# Patient Record
Sex: FEMALE | Race: WHITE
[De-identification: names, ages, dates, MRNs, and addresses within clinical notes are randomized per-mention and may not be internally consistent; named-entity substitution may affect disease eponyms.]

---

## 2019-08-02 ENCOUNTER — HOSPITAL ENCOUNTER (OUTPATIENT)
Dept: HOSPITAL 75 - CARD | Age: 54
End: 2019-08-02
Attending: PHYSICIAN ASSISTANT
Payer: COMMERCIAL

## 2019-08-02 VITALS — DIASTOLIC BLOOD PRESSURE: 84 MMHG | SYSTOLIC BLOOD PRESSURE: 148 MMHG

## 2019-08-02 VITALS — SYSTOLIC BLOOD PRESSURE: 146 MMHG | DIASTOLIC BLOOD PRESSURE: 82 MMHG

## 2019-08-02 VITALS — WEIGHT: 210 LBS | HEIGHT: 64 IN | BODY MASS INDEX: 35.85 KG/M2

## 2019-08-02 DIAGNOSIS — R06.09: ICD-10-CM

## 2019-08-02 DIAGNOSIS — E66.9: ICD-10-CM

## 2019-08-02 DIAGNOSIS — Z72.0: ICD-10-CM

## 2019-08-02 DIAGNOSIS — I10: Primary | ICD-10-CM

## 2019-08-02 PROCEDURE — 78452 HT MUSCLE IMAGE SPECT MULT: CPT

## 2019-08-02 PROCEDURE — 93017 CV STRESS TEST TRACING ONLY: CPT

## 2019-08-05 NOTE — STRESS TEST
DATE OF SERVICE:  08/02/2019



LEXISCAN MYOVIEW STRESS TEST REPORT



REFERRING PHYSICIAN:

Johnna Cook MD and Sohail Dubose MD.



Baseline heart rate is 51.  Baseline blood pressure 146/82.  Baseline EKG is

sinus rhythm with no ischemic changes.



In summary, the patient was injected with 10.81 mCi of technetium-99 Myoview and

the resting images were obtained.  Then, the patient received 0.4 mg of Lexiscan

followed by 32.5 mCi of technetium-99 Myoview.  Throughout the test, there were

no EKG changes.



The resting and stress images were reviewed and compared in the short axis,

horizontal long axis, and vertical long axis views.  Review of the images showed

good radiotracer uptake with no significant ischemia or infarction.  SSS is 1,

SDS 1, TID value 1.08.  On the gated images, the left ventricle appeared to be

normal size with normal contractility.  Calculated ejection fraction 67%.



CONCLUSION:

1.  The patient tolerated Lexiscan well.

2.  No ischemia or infarction on SPECT images.

3.  Normal left ventricular size with normal contractility.  Calculated ejection

fraction 67%.





Job ID: [id number]

DocumentID: [id number]

Dictated Date:  08/05/2019 16:43:30

Transcription Date: 08/05/2019 23:09:22

Dictated By: JOSE ARTEAGA MD

## 2019-08-29 ENCOUNTER — HOSPITAL ENCOUNTER (OUTPATIENT)
Dept: HOSPITAL 75 - PREOP | Age: 54
Discharge: HOME | End: 2019-08-29
Attending: ORTHOPAEDIC SURGERY
Payer: COMMERCIAL

## 2019-08-29 VITALS — DIASTOLIC BLOOD PRESSURE: 79 MMHG | SYSTOLIC BLOOD PRESSURE: 137 MMHG

## 2019-08-29 VITALS — WEIGHT: 208 LBS | HEIGHT: 64 IN | BODY MASS INDEX: 35.51 KG/M2

## 2019-08-29 DIAGNOSIS — M17.11: ICD-10-CM

## 2019-08-29 DIAGNOSIS — Z01.818: Primary | ICD-10-CM

## 2019-08-29 LAB
ALBUMIN SERPL-MCNC: 4 GM/DL (ref 3.2–4.5)
ALP SERPL-CCNC: 118 U/L (ref 40–136)
ALT SERPL-CCNC: 37 U/L (ref 0–55)
APTT PPP: YELLOW S
BACTERIA #/AREA URNS HPF: (no result) /HPF
BASOPHILS # BLD AUTO: 0 10^3/UL (ref 0–0.1)
BASOPHILS NFR BLD AUTO: 0 % (ref 0–10)
BILIRUB SERPL-MCNC: 0.4 MG/DL (ref 0.1–1)
BILIRUB UR QL STRIP: NEGATIVE
BUN/CREAT SERPL: 11
CALCIUM SERPL-MCNC: 8.9 MG/DL (ref 8.5–10.1)
CHLORIDE SERPL-SCNC: 104 MMOL/L (ref 98–107)
CO2 SERPL-SCNC: 20 MMOL/L (ref 21–32)
CREAT SERPL-MCNC: 0.82 MG/DL (ref 0.6–1.3)
EOSINOPHIL # BLD AUTO: 0.2 10^3/UL (ref 0–0.3)
EOSINOPHIL NFR BLD AUTO: 2 % (ref 0–10)
ERYTHROCYTE [DISTWIDTH] IN BLOOD BY AUTOMATED COUNT: 12.6 % (ref 10–14.5)
ERYTHROCYTE [SEDIMENTATION RATE] IN BLOOD: 21 MM/HR (ref 0–30)
FIBRINOGEN PPP-MCNC: CLEAR MG/DL
GFR SERPLBLD BASED ON 1.73 SQ M-ARVRAT: > 60 ML/MIN
GLUCOSE SERPL-MCNC: 119 MG/DL (ref 70–105)
GLUCOSE UR STRIP-MCNC: NEGATIVE MG/DL
HCT VFR BLD CALC: 43 % (ref 35–52)
HGB BLD-MCNC: 14.2 G/DL (ref 11.5–16)
INR PPP: 1 (ref 0.8–1.4)
KETONES UR QL STRIP: NEGATIVE
LEUKOCYTE ESTERASE UR QL STRIP: NEGATIVE
LYMPHOCYTES # BLD AUTO: 3.3 X 10^3 (ref 1–4)
LYMPHOCYTES NFR BLD AUTO: 34 % (ref 12–44)
MANUAL DIFFERENTIAL PERFORMED BLD QL: NO
MCH RBC QN AUTO: 29 PG (ref 25–34)
MCHC RBC AUTO-ENTMCNC: 33 G/DL (ref 32–36)
MCV RBC AUTO: 88 FL (ref 80–99)
MONOCYTES # BLD AUTO: 0.5 X 10^3 (ref 0–1)
MONOCYTES NFR BLD AUTO: 6 % (ref 0–12)
NEUTROPHILS # BLD AUTO: 5.5 X 10^3 (ref 1.8–7.8)
NEUTROPHILS NFR BLD AUTO: 58 % (ref 42–75)
NITRITE UR QL STRIP: NEGATIVE
PH UR STRIP: 6 [PH] (ref 5–9)
PLATELET # BLD: 269 10^3/UL (ref 130–400)
PMV BLD AUTO: 10 FL (ref 7.4–10.4)
POTASSIUM SERPL-SCNC: 3.2 MMOL/L (ref 3.6–5)
PROT SERPL-MCNC: 7.4 GM/DL (ref 6.4–8.2)
PROT UR QL STRIP: NEGATIVE
PROTHROMBIN TIME: 13.6 SEC (ref 12.2–14.7)
RBC #/AREA URNS HPF: (no result) /HPF
SODIUM SERPL-SCNC: 139 MMOL/L (ref 135–145)
SP GR UR STRIP: 1.01 (ref 1.02–1.02)
SQUAMOUS #/AREA URNS HPF: (no result) /HPF
UROBILINOGEN UR-MCNC: NORMAL MG/DL
WBC # BLD AUTO: 9.5 10^3/UL (ref 4.3–11)
WBC #/AREA URNS HPF: (no result) /HPF

## 2019-08-29 PROCEDURE — 81000 URINALYSIS NONAUTO W/SCOPE: CPT

## 2019-08-29 PROCEDURE — 86850 RBC ANTIBODY SCREEN: CPT

## 2019-08-29 PROCEDURE — 86900 BLOOD TYPING SEROLOGIC ABO: CPT

## 2019-08-29 PROCEDURE — 86901 BLOOD TYPING SEROLOGIC RH(D): CPT

## 2019-08-29 PROCEDURE — 85025 COMPLETE CBC W/AUTO DIFF WBC: CPT

## 2019-08-29 PROCEDURE — 36415 COLL VENOUS BLD VENIPUNCTURE: CPT

## 2019-08-29 PROCEDURE — 85610 PROTHROMBIN TIME: CPT

## 2019-08-29 PROCEDURE — 87081 CULTURE SCREEN ONLY: CPT

## 2019-08-29 PROCEDURE — 85652 RBC SED RATE AUTOMATED: CPT

## 2019-08-29 PROCEDURE — 71046 X-RAY EXAM CHEST 2 VIEWS: CPT

## 2019-08-29 PROCEDURE — 80053 COMPREHEN METABOLIC PANEL: CPT

## 2019-08-29 NOTE — HISTORY AND PHYSICAL
DATE OF SERVICE:  



ADMISSION HISTORY AND PHYSICAL



DATE OF SERVICE, SURGERY AND ADMISSION:

09/04/2019.



This will be an inpatient admission on 09/04/2019, for right total knee

arthroplasty.  The patient will require regular inpatient admission due to gait

abnormalities, weakness and pain management.



HISTORY OF PRESENT ILLNESS:

The patient is a 54-year-old female with progressively worsening right knee

pain.  She reports the pain is interfering with her activities of daily living. 

She has tried extensive conservative measures including activity modifications,

altered shoe wear, home exercise program and anti-inflammatories without

significant relief.  Due to progressive pain and failure to improve with

conservative measures, the patient elected to proceed with surgical

intervention.  Radiographs revealed severe medial and patellofemoral joint space

narrowing.



REVIEW OF SYSTEMS:

No chest pain, no shortness of breath, no dysuria.



PAST MEDICAL HISTORY:

Reflux, hypertension, tobacco use.



PAST SURGICAL HISTORY:

Hysterectomy, tubal ligation, D and C.



FAMILY HISTORY:

Significant for colon cancer, diabetes.



PRIMARY CARE PROVIDER:

Dr. Cook.



MEDICATIONS:

Ziac, Nexium and Mobic.



ALLERGIES:

No known drug allergies.



SOCIAL HISTORY:

The patient smokes a pack and half cigarettes per day.  Denies alcohol use.



PHYSICAL EXAMINATION:

GENERAL:  The patient is a well-developed, well-nourished, in no acute distress.

HEENT:  Normocephalic, atraumatic.  Pupils are equal, round and reactive to

light.  Oropharynx is clear.

NECK:  Supple, no lymphadenopathy.

LUNGS:  Clear to auscultation bilaterally.

HEART:  Regular rate and rhythm.

ABDOMEN:  Soft, nontender, nondistended.

EXTREMITIES:  The patient ambulates with an antalgic gait on the right.  The

right knee demonstrates a moderate effusion.  She is markedly tender along the

medial joint line, has pain medially with Ulices's.  She has marked

patellofemoral crepitus and pain with patellar loading.  Range of motion

0/0/120.  There is no significant varus valgus laxity.  Negative anterior and

posterior drawer.



IMPRESSION:

Right knee severe osteoarthritis, unresponsive to conservative measures.



PLAN:

Right total knee arthroplasty.  The risks, benefits, options, ramifications and

recovery have been discussed at length with the patient.  She understands and

wishes to proceed.





Job ID: [id number]

DocumentID: [id number]

Dictated Date:  08/25/2019 15:12:40

Transcription Date: 08/25/2019 16:07:34

Dictated By: LEESA BAILON MD

## 2019-08-29 NOTE — DIAGNOSTIC IMAGING REPORT
INDICATION: Preoperative evaluation for knee replacement.



COMPARISON: 01/11/2019



FINDINGS: Frontal and lateral views of the chest demonstrate

normal heart size and pulmonary vascularity. The lungs are clear.

There are no signs of infiltrate, pleural effusions or

pneumothoraces. The visualized osseous structures show no acute

abnormalities.



IMPRESSION: 

1. No acute process. No signs of infiltrates, effusions or

pneumothoraces.



Dictated by: 



  Dictated on workstation # [id number]

## 2019-08-29 NOTE — NUR
VERIFIED WITH [facility] WHAT THEY HAVE FILLED RECENTLY FOR THE PATIENT. 
THE TWO PRESCRIPTIONS ENTERED IN PREOP MATCH THE PHARMACIES RECORDS. ALSO REPORTED IS NEXIUM 
Q48H OTC. I DID NOT CALL AND RE-INTERVIEW THE PATIENT AT THIS TIME.



[facility] FILLED:

8-8-19 MOBIC 15MG DAILY #30

8-6-19 BISOPROLOL HCTZ 2.5-6.25MG DAILY #30

## 2019-09-04 ENCOUNTER — HOSPITAL ENCOUNTER (INPATIENT)
Dept: HOSPITAL 75 - 4TH | Age: 54
LOS: 2 days | Discharge: HOME HEALTH | DRG: 470 | End: 2019-09-06
Attending: ORTHOPAEDIC SURGERY | Admitting: ORTHOPAEDIC SURGERY
Payer: COMMERCIAL

## 2019-09-04 VITALS — DIASTOLIC BLOOD PRESSURE: 78 MMHG | SYSTOLIC BLOOD PRESSURE: 145 MMHG

## 2019-09-04 VITALS — DIASTOLIC BLOOD PRESSURE: 69 MMHG | SYSTOLIC BLOOD PRESSURE: 145 MMHG

## 2019-09-04 VITALS — SYSTOLIC BLOOD PRESSURE: 131 MMHG | DIASTOLIC BLOOD PRESSURE: 76 MMHG

## 2019-09-04 VITALS — SYSTOLIC BLOOD PRESSURE: 120 MMHG | DIASTOLIC BLOOD PRESSURE: 73 MMHG

## 2019-09-04 VITALS — DIASTOLIC BLOOD PRESSURE: 77 MMHG | SYSTOLIC BLOOD PRESSURE: 127 MMHG

## 2019-09-04 VITALS — HEIGHT: 64 IN | BODY MASS INDEX: 35.55 KG/M2 | WEIGHT: 208.25 LBS

## 2019-09-04 VITALS — SYSTOLIC BLOOD PRESSURE: 148 MMHG | DIASTOLIC BLOOD PRESSURE: 72 MMHG

## 2019-09-04 VITALS — DIASTOLIC BLOOD PRESSURE: 77 MMHG | SYSTOLIC BLOOD PRESSURE: 124 MMHG

## 2019-09-04 VITALS — DIASTOLIC BLOOD PRESSURE: 81 MMHG | SYSTOLIC BLOOD PRESSURE: 128 MMHG

## 2019-09-04 VITALS — DIASTOLIC BLOOD PRESSURE: 65 MMHG | SYSTOLIC BLOOD PRESSURE: 122 MMHG

## 2019-09-04 VITALS — SYSTOLIC BLOOD PRESSURE: 114 MMHG | DIASTOLIC BLOOD PRESSURE: 66 MMHG

## 2019-09-04 VITALS — SYSTOLIC BLOOD PRESSURE: 111 MMHG | DIASTOLIC BLOOD PRESSURE: 64 MMHG

## 2019-09-04 VITALS — SYSTOLIC BLOOD PRESSURE: 144 MMHG | DIASTOLIC BLOOD PRESSURE: 85 MMHG

## 2019-09-04 DIAGNOSIS — F17.210: ICD-10-CM

## 2019-09-04 DIAGNOSIS — K21.9: ICD-10-CM

## 2019-09-04 DIAGNOSIS — I10: ICD-10-CM

## 2019-09-04 DIAGNOSIS — M17.11: Primary | ICD-10-CM

## 2019-09-04 PROCEDURE — 85014 HEMATOCRIT: CPT

## 2019-09-04 PROCEDURE — 36415 COLL VENOUS BLD VENIPUNCTURE: CPT

## 2019-09-04 PROCEDURE — 86901 BLOOD TYPING SEROLOGIC RH(D): CPT

## 2019-09-04 PROCEDURE — 86900 BLOOD TYPING SEROLOGIC ABO: CPT

## 2019-09-04 PROCEDURE — 73560 X-RAY EXAM OF KNEE 1 OR 2: CPT

## 2019-09-04 PROCEDURE — 94664 DEMO&/EVAL PT USE INHALER: CPT

## 2019-09-04 PROCEDURE — 0SRC0J9 REPLACEMENT OF RIGHT KNEE JOINT WITH SYNTHETIC SUBSTITUTE, CEMENTED, OPEN APPROACH: ICD-10-PCS | Performed by: ORTHOPAEDIC SURGERY

## 2019-09-04 PROCEDURE — 85018 HEMOGLOBIN: CPT

## 2019-09-04 PROCEDURE — 86850 RBC ANTIBODY SCREEN: CPT

## 2019-09-04 RX ADMIN — OXYCODONE HYDROCHLORIDE AND ACETAMINOPHEN PRN TAB: 5; 325 TABLET ORAL at 15:06

## 2019-09-04 RX ADMIN — OXYCODONE HYDROCHLORIDE AND ACETAMINOPHEN PRN TAB: 5; 325 TABLET ORAL at 19:02

## 2019-09-04 RX ADMIN — SODIUM CHLORIDE, SODIUM LACTATE, POTASSIUM CHLORIDE, AND CALCIUM CHLORIDE PRN MLS/HR: 600; 310; 30; 20 INJECTION, SOLUTION INTRAVENOUS at 07:50

## 2019-09-04 RX ADMIN — DIPHENHYDRAMINE HYDROCHLORIDE PRN MG: 50 INJECTION INTRAMUSCULAR; INTRAVENOUS at 19:01

## 2019-09-04 RX ADMIN — DOCUSATE SODIUM AND SENNOSIDES SCH EA: 8.6; 5 TABLET, FILM COATED ORAL at 20:26

## 2019-09-04 RX ADMIN — SODIUM CHLORIDE SCH MLS/HR: 900 INJECTION, SOLUTION INTRAVENOUS at 11:09

## 2019-09-04 RX ADMIN — DOCUSATE SODIUM AND SENNOSIDES SCH EA: 8.6; 5 TABLET, FILM COATED ORAL at 12:28

## 2019-09-04 RX ADMIN — SODIUM CHLORIDE SCH MLS/HR: 900 INJECTION, SOLUTION INTRAVENOUS at 23:19

## 2019-09-04 RX ADMIN — OXYCODONE HYDROCHLORIDE AND ACETAMINOPHEN PRN TAB: 5; 325 TABLET ORAL at 11:00

## 2019-09-04 RX ADMIN — SODIUM CHLORIDE SCH MLS/HR: 900 INJECTION, SOLUTION INTRAVENOUS at 21:58

## 2019-09-04 RX ADMIN — CEFUROXIME SCH MLS/HR: 750 INJECTION, POWDER, FOR SOLUTION INTRAMUSCULAR; INTRAVENOUS at 15:17

## 2019-09-04 RX ADMIN — DIPHENHYDRAMINE HYDROCHLORIDE PRN MG: 50 INJECTION INTRAMUSCULAR; INTRAVENOUS at 13:27

## 2019-09-04 RX ADMIN — SODIUM CHLORIDE, SODIUM LACTATE, POTASSIUM CHLORIDE, AND CALCIUM CHLORIDE PRN MLS/HR: 600; 310; 30; 20 INJECTION, SOLUTION INTRAVENOUS at 06:35

## 2019-09-04 RX ADMIN — OXYCODONE HYDROCHLORIDE AND ACETAMINOPHEN PRN TAB: 5; 325 TABLET ORAL at 23:19

## 2019-09-04 RX ADMIN — CEFUROXIME SCH MLS/HR: 750 INJECTION, POWDER, FOR SOLUTION INTRAMUSCULAR; INTRAVENOUS at 23:19

## 2019-09-04 NOTE — OPERATIVE REPORT
DATE OF SERVICE:  09/04/2019



PREOPERATIVE DIAGNOSIS:

Right knee primary osteoarthritis.



POSTOPERATIVE DIAGNOSIS:

Right knee primary osteoarthritis.



PROCEDURE:

Right total knee arthroplasty.



SURGEON:

Sohail Bailon MD



ASSISTANT:

Joshua Armenta, who assisted throughout the procedure and closed the incision.



ANESTHESIA:

General endotracheal by Dr. Anthony.



TOURNIQUET TIME:

Approximately, 70 minutes at 300 mmHg.



ESTIMATED BLOOD LOSS:

Minimal.



DRAINS:

None.



COMPLICATIONS:

None.



POSTOPERATIVE PLAN:

Routine protocol.  The patient was transferred to the recovery room awake and in

stable condition.



MATERIALS:

Microport cemented size 4 femur, cemented size 4 tibia with a 10 mm insert and

cemented size 32 patellar button.



STATEMENT OF MEDICAL NECESSITY:

The patient is a 54-year-old female with longstanding progressive left knee

pain.  Radiographs revealed severe medial with moderate patellofemoral

arthrosis.  She has tried extensive conservative measures, but had continued

unrelenting pain with activity limitations and because of this, the patient

elected to proceed with surgical intervention.



DESCRIPTION OF PROCEDURE:

After risks and benefits of procedure were discussed and questions were

answered, an informed consent was signed and placed on chart.  The operative

site was confirmed in the preoperative holding area initialed by the surgeon. 

The patient was then transferred to the operating room.  After adequate levels

of general endotracheal anesthetic were obtained, a timeout was called,

confirming the operative site.  The right lower extremity was prepped and draped

in the usual sterile fashion with the leg elevated and the knee flexed. 

Tourniquet was inflated to 300 mmHg.  A standard anterior approach was utilized.

 Hemostasis was obtained with cautery.  A medial parapatellar arthrotomy was

performed leaving 1 cm cuff on the patella for later reapproximation.  A portion

of the fat pad was resected.  The ACL was resected.  A subperiosteal release was

carefully performed on the proximal medial tibia being careful to stay on the

bony surface.  Intramedullary guide was passed into the femur.  The distal

cutting block was placed and the femur was sized to size 4.  The 4 cutting block

was placed parallel to the epicondylar axis and cuts were made from posterior to

anterior.  Subperiosteal release was then carefully performed on the posterior

distal femur, being careful to stay on the bony surface.  A portion of the fat

pad was resected.  The intramedullary guide was passed into the tibia.  The

cutting block was placed and drop derrell transected the intermalleolar axis and the

cut was made.  The four baseplate was placed and the drop derrell transected the

intermalleolar axis.  This was then prepared with the drill and keel punch.  The

femoral trial was placed and the trochlear cut was made.  The patella was then

prepared by using the freehand technique and resecting 10 mm off the

undersurface.  The peg guide was placed and the peg holes were drilled.  The

trials were inserted.  Full extension was easily obtained, 120 degrees of

flexion with gravity was easily obtained.  The patella tracked well.  There was

no anterior/posterior or medial/lateral laxity in flexion or extension.  The

trials were removed.  The joint was copiously irrigated with pulse lavage.  The

periarticular block was placed in the posterior capsule, medial and lateral

retinaculum, extensor mechanism, subcutaneous tissues.  The joint was further

irrigated.  The bone ends were irrigated and dried.  The tibial baseplate was

cemented into position.  Excessive cement was removed.  The superior surface was

irrigated and dried and the polyethylene insert was placed.  Distal femur was

irrigated and dried and the femoral prosthesis was cemented into position.  The

knee was brought out into full extension until the cement had cured.  The

undersurface of the patella was irrigated and dried.  The patellar button was

cemented into position.  Excessive cement was removed.  Once the cement had

cured, the knee was taken through range of motion.  Full extension was easily

obtained, 120 degrees of flexion with gravity was easily obtained.  The patella

tracked well.  There was no significant anterior/posterior or medial/lateral

laxity in flexion or extension.  The joint was further irrigated with pulse

lavage.  The arthrotomy was closed with #2 Tevdek in figure-of-eight interrupted

fashion.  Subcutaneous tissues were irrigated using a total of 6 liters

throughout the procedure.  A 0 Vicryl was used for deep subcutaneous tissue, 2-0

Vicryl for the superficial subcutaneous tissue, staples were used on the skin. 

A soft dressing was applied.  The tourniquet was deflated and the patient was

transferred to recovery room awake and in stable condition.





Job ID: [id number]

DocumentID: [id number]

Dictated Date:  09/04/2019 09:23:08

Transcription Date: 09/04/2019 12:36:14

Dictated By: SOHAIL BAILON MD

## 2019-09-04 NOTE — PHYSICAL THERAPY EVALUATION
PT Evaluation-General


Medical Diagnosis


Admission Date


Sep 4, 2019 at 06:00


Medical Diagnosis:  right TKA


Onset Date:  Sep 4, 2019





Therapy Diagnosis


Therapy Diagnosis:  impaired mobility, strength, endurance, ROM





Height/Weight


Height (Feet):  5


Height (Inches):  4.00


Weight (Pounds):  208


Weight (Ounces):  4.0





Precautions


Precautions/Isolations:  Standard Precautions





Weight Bear Status


Right Lower Extremity:  Right


Weight Bearing/Tolerated





Referral


Physician:  Joshua Armenta


Reason for Referral:  Evaluation/Treatment





Medical History


Pertinent Medical History:  HTN


Additional Medical History


reflux, tobacco use, hysterectomy, tubal ligation


Reviewed History:  Yes





Social History


Home:  Multilevel


Current Living Status:  Spouse


Entry Into Home:  Stairs With Railing


PT Steps Into Home:  3





Prior/Core FIM


Prior Level of Function


Therapy Code Descriptions/Definitions 





Functional [place] Measure:


0=Not Assessed/NA        4=Minimal Assistance


1=Total Assistance        5=Supervision or Setup


2=Maximal Assistance  6=Modified [place]


3=Moderate Assistance 7=Complete [place]








Therapy Quality Codes:


6    Independent with activity with or without an assistive device


5    Patient requires set up or clean up by helper.  Patient completes activity 

by  themselves


4    Supervision or touching assist (CGA). [place] provide cues , steadying 

assist


3    The helper provides less than half the effort to complete the activity


2    The helper provides more than half the effort to complete the activity


1    Dependent.  The helper does all the effort to complete an activity 


7    Patient refused to complete or attempt activity


9    The patient did not perform the activity before the current illness or 

injury


88  Not attempted due to Medical conditions or safety concerns





Functional Abilities and Goals:


Independent: Patient completed the activities by him/herself, with or without an

assistive device, with no assistance from a helper.


Needed Some Help: Patient needed partial assistance from another person to 

complete activities.


Dependent: A helper completed the activities for the patient. 


Unknown:


Not Applicable:


Bed Mobility:  7


Transfers (B,C,W/C) (FIM):  7


Gait:  7


Stairs:  7


Indoor Mobility (Ambulation):  Independent


Stairs:  Independent





PT Evaluation-Current


Subjective


Patient in bed pre tx, agrees to PT, has 8/10 pain in right knee.  Patient is 

very drowsy and states she has had some Benadryl for an allergic reaction and 

she is very nauseated.  She is slurring words and can barely converse.  Patient 

will not be ambulating this morning due to safety reasons and nausea.





Pt/Family Goals


to be independent at home





Objective


Patient Orientation:  Person, [place]


Attachments:  Oxygen, IV





ROM/Strength


ROM Lower Extremities


right knee extension +6 degrees, flexion 70 degrees





Sensory


Vision:  Wears Glasses


Hearing:  Functional


Sensation Right Lower Extremit:  Impaired


Sensation Left Lower Extremity:  Intact





Transfers


Therapy Code Descriptions/Definitions 





Functional [place] Measure:


0=Not Assessed/NA        4=Minimal Assistance


1=Total Assistance        5=Supervision or Setup


2=Maximal Assistance  6=Modified [place]


3=Moderate Assistance 7=Complete [place]





Treatment


Supine TKA exercises on the right side x10 (AP, QS, HS, SAQ, SLR).  CPM applied 

and set to her leg and set to -2/50.





Assessment/Needs


Patient has impaired mobility, strength, endurance, ROM.  She is very lethargic 

and nauseated.


Rehab Potential:  Fair





PT Short Term Goals


Short Term Goals


Time Frame:  Sep 11, 2019


Transfers (B,C,W/C) (FIM):  4


Gait (FIM):  2


Gait Distance Comment:  50'


Gait Level of Assist:  4


Gait Assistive Device:  FWW





PT Plan


Problem List


Problem List:  Activity Tolerance, Functional Strength, Safety, Balance, Gait, 

Transfer, Bed Mobility, ROM





Treatment/Plan


Treatment Plan:  Continue Plan of Care


Treatment Plan:  Bed Mobility, Education, Functional Activity Luke, Functional 

Strength, Gait, Safety, Therapeutic Exercise, Transfers


Treatment Duration:  Sep 11, 2019


Frequency:  11 times per week


Estimated Hrs Per Day:  .25 hour per day


Patient and/or Family Agrees t:  Yes





Safety Risks/Education


Patient Education:  Correct Positioning, Safety Issues


Teaching Recipient:  Patient


Teaching Methods:  Demonstration, Discussion


Response to Teaching:  Reinforcement Needed





Discharge Recommendations


Plan


Patient will perform bed mobility and transfer training, balance and endurance 

training, functional strengthening, stair training, gait training, and 

education, to improve functional mobility and independence at home.


Therapy Discharge Recommendati:  Other, See Comments (home with family)





Time/GCodes


Time In:  1334


Time Out:  1350


Total Billed Treatment Time:  16


Total Billed Treatment


1 visit


ZAKI Torres'











AGUILAR ARCHULETA PT                 Sep 4, 2019 14:04

## 2019-09-04 NOTE — PROGRESS NOTE
Standard Progress Note


Progress Notes/Assess & Plan


Date Seen by a Provider:  Sep 4, 2019


Time Seen by a Provider:  10:09


Progress/Assessment & Plan


post op check


 No complaints


denies paresthesias


Radiographs--HW well positioned without fracture


RLE--intact DF and PF of toes and ankle. sym DP pulse with brisk cap refill. 

sensation intact to light touch throughout


s/p R TKA


mobilize as able











LEESA BAILON MD             Sep 4, 2019 10:11

## 2019-09-04 NOTE — NUR
DEWEY TROY S admitted to room OR-1, with an admitting diagnosis of right total knee 
replacement , on 09/04/19 from OR , accompanied by staff  present in room 
.DEWEY TROY introduced to surroundings, call light, bed controls, phone, TV, 
temperature control, lights, meal times, smoking policy, visitor policy, side rail policy, 
bathrooms and showers.  Patient Rights given to patient in the handbook. DEWEY TROY 
verbalizes understanding that [facility] is not responsible for the loss or damage to any 
personal effects or valuables that are kept in the patients posession during their 
hospitalization.  The following Patient Care Plans and discharge were discussed with the 
patient and  present. DEWEY TROY verbalizes understanding of Interdisciplinary 
Patient Education. Patient and family were informed about the Rapid Response Team and its 
purpose.

## 2019-09-04 NOTE — D/C HH FACE TO FACE ORDER
D/C  Face to Face Orders


Reconcile Patient Problems


Problems Reviewed?:  Yes





Instructions for Patient


[facility] [employer], [phone]


Patient Instructions/FollowUp:  


three weeks


Physician to follow Patient:  three weeks


Discharge Diet for Home:  ADA Diet





Patient Data-Allergies,Ht & Wt


Patient Allergies:  


Coded Allergies:  


     No Known Drug Allergies (Unverified , 8/29/19)


Height (Feet):  5


Height (Inches):  4.00


Weight (Pounds):  208


Weight (Ounces):  4.0





Home Health Need/Face to Face


Date of Face to Face:  Sep 4, 2019


Clinical Findings:  Instability, Muscle weakness, Pain with ambulation, Unsteady

gait


I have seen Pt face-to-face:  Yes


Discharged To:  Home


Diagnosis/Conditions:  


right total knee arthroplasty


Patient is Homebound due to:  Pattie fall risk due to instabilty, Muscle 

weakness, Pain w/ambulation


Homebound Status


   Due to the above stated illness, injury or surgical procedure (medical 

condition or diagnosis) and associated clinical findings, the patient is 

homebound because of his/her inability to leave home except with aid of a 

supportive device and/or person AND leaving the home requires a considerable and

taxing effort or is medically contraindicated.


Pt req the following assistanc:  Walker





Home Health Nursing Orders


Home Health Services Order:  Physical Therapy-Evaluate & Treat





DC right knee staples and apply steri strips 9/18/19





Home Health Infusion Therapy


Line Start Date:  Sep 4, 2019





Therapy Orders


Therapy Orders:  Physical Therapy, PT to assess for OT


Therapy Specific Orders:  Eval assistive deivces, Teach enviro 

modifications/safety, Gait training, Increase strength/endurance, Provider 

maintenance therapy, Restore ROM





OK to begin on Monday


Certify Stmt


I certify that this patient is under my care and that I, a nurse practitioner or

a physician; a assistant working with me, had a face to face encounter that -

meets the physician face to face encounter requirements with this patient as 

dated.











LEESA BAILON MD             Sep 4, 2019 07:28
today

## 2019-09-04 NOTE — DIAGNOSTIC IMAGING REPORT
INDICATION: 

Right total knee arthroplasty.



COMPARISON:  

None available.



TECHNIQUE: 

Two radiographs of the right knee dated 09/04/2019. 



FINDINGS: 

Recent placement of a right total knee arthroplasty is noted with

post surgical subcutaneous emphysema and skin staples in place.

No evidence of immediate hardware complication. No acute fracture

or dislocation. No destructive osseous process. No suspicious

radiopaque foreign body.



IMPRESSION: 

Recent placement of a right total knee arthroplasty without

evidence of immediate hardware complication or acute osseous

abnormality.



Dictated by: 



  Dictated on workstation # [id number]

## 2019-09-04 NOTE — PROGRESS NOTE-POST OPERATIVE
Post-Operative Progess Note


Surgeon (s)/Assistant (s)


Surgeon


LEESA BAILON MD


Assistant:  Joshua Armenta





Pre-Operative Diagnosis


right knee primary osteoarthritis





Post-Operative Diagnosis





right knee primary osteoarthritis





Procedure & Operative Findings


Date of Procedure


9/4/19


Procedure Performed/Findings


right total knee arthroplasty


Anesthesia Type


GETA





Estimated Blood Loss


Estimated blood loss (mL):  minimal





Specimens/Packing


Specimens Removed


none


Packing:  


none











LEESA BAILON MD             Sep 4, 2019 07:31

## 2019-09-04 NOTE — PROGRESS NOTE-PRE OPERATIVE
Pre-Operative Progress Note


H&P Reviewed


The H&P was reviewed, patient examined and no changes noted.


Date Seen by Provider:  Sep 4, 2019


Time Seen by Provider:  07:15


Date H&P Reviewed:  Sep 4, 2019


Time H&P Reviewed:  07:11


Pre-Operative Diagnosis:  right knee primary osteoarthritis











LEESA BAILON MD             Sep 4, 2019 07:30

## 2019-09-05 VITALS — SYSTOLIC BLOOD PRESSURE: 108 MMHG | DIASTOLIC BLOOD PRESSURE: 65 MMHG

## 2019-09-05 VITALS — DIASTOLIC BLOOD PRESSURE: 80 MMHG | SYSTOLIC BLOOD PRESSURE: 153 MMHG

## 2019-09-05 VITALS — SYSTOLIC BLOOD PRESSURE: 121 MMHG | DIASTOLIC BLOOD PRESSURE: 80 MMHG

## 2019-09-05 VITALS — DIASTOLIC BLOOD PRESSURE: 75 MMHG | SYSTOLIC BLOOD PRESSURE: 118 MMHG

## 2019-09-05 VITALS — SYSTOLIC BLOOD PRESSURE: 130 MMHG | DIASTOLIC BLOOD PRESSURE: 74 MMHG

## 2019-09-05 LAB
HCT VFR BLD CALC: 39 % (ref 35–52)
HGB BLD-MCNC: 12.8 G/DL (ref 11.5–16)

## 2019-09-05 RX ADMIN — MORPHINE SULFATE PRN MG: 4 INJECTION, SOLUTION INTRAMUSCULAR; INTRAVENOUS at 08:13

## 2019-09-05 RX ADMIN — OXYCODONE HYDROCHLORIDE AND ACETAMINOPHEN PRN TAB: 5; 325 TABLET ORAL at 19:36

## 2019-09-05 RX ADMIN — SODIUM CHLORIDE SCH MLS/HR: 900 INJECTION, SOLUTION INTRAVENOUS at 10:52

## 2019-09-05 RX ADMIN — OXYCODONE HYDROCHLORIDE AND ACETAMINOPHEN PRN TAB: 5; 325 TABLET ORAL at 12:27

## 2019-09-05 RX ADMIN — OXYCODONE HYDROCHLORIDE AND ACETAMINOPHEN PRN TAB: 5; 325 TABLET ORAL at 14:40

## 2019-09-05 RX ADMIN — ASPIRIN SCH MG: 81 TABLET ORAL at 08:02

## 2019-09-05 RX ADMIN — DOCUSATE SODIUM AND SENNOSIDES SCH EA: 8.6; 5 TABLET, FILM COATED ORAL at 08:02

## 2019-09-05 RX ADMIN — MORPHINE SULFATE PRN MG: 4 INJECTION, SOLUTION INTRAMUSCULAR; INTRAVENOUS at 22:44

## 2019-09-05 RX ADMIN — OXYCODONE HYDROCHLORIDE AND ACETAMINOPHEN PRN TAB: 5; 325 TABLET ORAL at 03:34

## 2019-09-05 RX ADMIN — OXYCODONE HYDROCHLORIDE AND ACETAMINOPHEN PRN TAB: 5; 325 TABLET ORAL at 06:33

## 2019-09-05 RX ADMIN — ENOXAPARIN SODIUM SCH MG: 30 INJECTION SUBCUTANEOUS at 19:37

## 2019-09-05 RX ADMIN — CYCLOBENZAPRINE HYDROCHLORIDE PRN MG: 10 TABLET, FILM COATED ORAL at 14:02

## 2019-09-05 RX ADMIN — OXYCODONE HYDROCHLORIDE AND ACETAMINOPHEN PRN TAB: 5; 325 TABLET ORAL at 10:20

## 2019-09-05 RX ADMIN — OXYCODONE HYDROCHLORIDE AND ACETAMINOPHEN PRN TAB: 5; 325 TABLET ORAL at 21:40

## 2019-09-05 RX ADMIN — MORPHINE SULFATE PRN MG: 4 INJECTION, SOLUTION INTRAMUSCULAR; INTRAVENOUS at 18:03

## 2019-09-05 RX ADMIN — OXYCODONE HYDROCHLORIDE AND ACETAMINOPHEN PRN TAB: 5; 325 TABLET ORAL at 16:52

## 2019-09-05 RX ADMIN — OXYCODONE HYDROCHLORIDE AND ACETAMINOPHEN PRN TAB: 5; 325 TABLET ORAL at 23:44

## 2019-09-05 RX ADMIN — DIPHENHYDRAMINE HYDROCHLORIDE PRN MG: 50 INJECTION INTRAMUSCULAR; INTRAVENOUS at 00:10

## 2019-09-05 RX ADMIN — DOCUSATE SODIUM AND SENNOSIDES SCH EA: 8.6; 5 TABLET, FILM COATED ORAL at 19:36

## 2019-09-05 RX ADMIN — ENOXAPARIN SODIUM SCH MG: 30 INJECTION SUBCUTANEOUS at 08:02

## 2019-09-05 RX ADMIN — Medication SCH EA: at 06:32

## 2019-09-05 RX ADMIN — MORPHINE SULFATE PRN MG: 4 INJECTION, SOLUTION INTRAMUSCULAR; INTRAVENOUS at 13:36

## 2019-09-05 NOTE — NUR
Initial visit; the pt shared that she and her  Kaushal used to attend [employer] 
[employer] until some recent changes within the [employer]. She described feeling displaced at 
this time as they continue to attend various churches in hopes of establishing a home 
[employer]. The pt also shared concerns for her , feeling her hospitalization to concern 
him. The pt welcomed prayer for her healing process, for her , and for spiritual 
guidance.

## 2019-09-05 NOTE — NUR
CM/SS, respond to consult.



HHC:  Coordinated with patient preferred agency that is in network with insurance, [place] 
[employer]ef[facility], [facility].  They are seeking pre auth through benefits.



DME:  Patient has FWW, stool riser, shower chair.  She indicates no other DME needs at this 
time.



Patient will return home with spouse as before.  Follow for any change in circumstances that 
require alternate discharge plan.

## 2019-09-05 NOTE — PROGRESS NOTE
Standard Progress Note


Progress Notes/Assess & Plan


Date Seen by a Provider:  Sep 5, 2019


Time Seen by a Provider:  07:40


Progress/Assessment & Plan


post op check


 No complaints


denies paresthesias


Radiographs--HW well positioned without fracture


RLE--intact DF and PF of toes and ankle. sym DP pulse with brisk cap refill. 

sensation intact to light touch throughout


s/p R TKA


mobilize as able


Final Diagnosis


no complaints


ambulating with assistance





Vital Signs








  Date Time  Temp Pulse Resp B/P (MAP) Pulse Ox O2 Delivery O2 Flow Rate FiO2


 


9/5/19 04:00 98.3 57 18 108/65 (79) 95 Room Air  


 


9/5/19 00:00 96.6 54 18 130/74 (92) 98 Room Air  


 


9/4/19 21:00      Room Air  


 


9/4/19 20:00 97.8 76 18 131/76 (94) 95 Room Air  


 


9/4/19 17:38      Room Air  


 


9/4/19 16:00 97.2 82 18 128/81 (97) 96 Room Air  


 


9/4/19 12:00 97.9 76 18 124/77 (93) 96 Room Air  


 


9/4/19 10:15      Room Air  


 


9/4/19 10:15 97.8  20  94 Room Air  


 


9/4/19 10:10   18  95 Room Air  


 


9/4/19 10:00   18  95 Room Air  


 


9/4/19 10:00      Room Air  


 


9/4/19 09:55      OxyMask 6 


 


9/4/19 09:50   18  99 OxyMask 6 


 


9/4/19 09:43      OxyMask 6 


 


9/4/19 09:42      OxyMask 6 


 


9/4/19 09:40   18  99 OxyMask 6 


 


9/4/19 09:30      OxyMask 6 


 


9/4/19 09:30   18  99 OxyMask 6 


 


9/4/19 09:25   18  98 OxyMask 6 


 


9/4/19 09:18 97.5  12  99 OxyMask 6 


 


9/4/19 09:18      OxyMask 6 














I & O 


 


 9/5/19





 07:00


 


Intake Total 2515 ml


 


Balance 2515 ml








Laboratory Tests








Test


 9/5/19


06:20 Range/Units


 


 


Hemoglobin 12.8  11.5-16.0  G/DL


 


Hematocrit 39  35-52  %





RLE--dressing intact. No calf tenderness. Neg Evens's. NVI distally


s/p RTKA doing well


PT/OT











LEESA BAILON MD             Sep 5, 2019 07:41

## 2019-09-05 NOTE — ANESTHESIA-GENERAL POST-OP
General


Patient Condition


Mental Status/LOC:  Same as Preop


Cardiovascular:  Satisfactory


Nausea/Vomiting:  Absent


Respiratory:  Satisfactory


Pain:  Controlled


Complications:  Absent





Post Op Complications


Complications


None





Follow Up Care/Instructions


Patient Instructions


None needed.





Anesthesia/Patient Condition


Patient Condition


Patient is doing well, no complaints, stable vital signs, no apparent adverse 

anesthesia problems.   


No complications reported per nursing.











FRANCISCO J MURILLO CRNA           Sep 5, 2019 08:44

## 2019-09-05 NOTE — NUR
IRF Evaluation 



Order received to evaluate patient for the ARU. Chart review complete and it appears patient 
is up ad markus in room; modified independent with transfers and ambulation (400ft, FW); 
therefore, patient does not require intensive therapies, at this time. 



Thank you for this referral.

## 2019-09-05 NOTE — PHYSICAL THERAPY DAILY NOTE
PT Daily Note-Current


Subjective


Patient agrees to PT.  Rated right knee pain 4/10.





Pain





   Numeric Pain Scale:  4


   Location:  Right


   Location Body Site:  Knee


   Pain Description:  Acute





Mental Status


Patient Orientation:  Normal For Age


Attachments:  IV





Transfers


Therapy Code Descriptions/Definitions 





Functional [place] Measure:


0=Not Assessed/NA        4=Minimal Assistance


1=Total Assistance        5=Supervision or Setup


2=Maximal Assistance  6=Modified [place]


3=Moderate Assistance 7=Complete [place]








Therapy Quality Codes:


6    Independent with activity with or without an assistive device


5    Patient requires set up or clean up by helper.  Patient completes activity 

by  themselves


4    Supervision or touching assist (CGA). [place] provide cues , steadying 

assist


3    The helper provides less than half the effort to complete the activity


2    The helper provides more than half the effort to complete the activity


1    Dependent.  The helper does all the effort to complete an activity 


7    Patient refused to complete or attempt activity


9    The patient did not perform the activity before the current illness or 

injury


88  Not attempted due to Medical conditions or safety concerns


Transfers (B, C, W/C) (FIM):  6


Scootin


Supine to/from Sit:  6


Sit to/from Stand:  6





Weight Bearing


Right Lower Extremity:  Right


Weight Bearing/Tolerated





Gait Training


Gait (FIM):  6


Distance (FIM):  3=150 ft


Distance:  400'


Gait Level of Assist:  6


Gait Assistive Device:  FWW


steady, antalgic gait sequence





Exercises


Supine Ex:  Ankle pumps, Quad Set, Heel Slides, Straight leg raise


Supine Reps:  15


Seated Therapy Exercises:  Ankle pumps, Long arc quads


Seated Reps:  15





Treatments


CPM 0-70 degrees with polar pack in place after treatment.





Assessment


Patient tolerated treatment well and returned to bed with CPM in place.  Plan 

dismissal to home tomorrow after PT.





PT Short Term Goals


Short Term Goals


Time Frame:  Sep 11, 2019


Transfers (B,C,W/C) (FIM):  4


Gait (FIM):  2


Gait Distance Comment:  50'


Gait Level of Assist:  4


Gait Assistive Device:  FWW





PT Plan


Treatment/Plan


Treatment Plan:  Continue Plan of Care


Treatment Plan:  Bed Mobility, Education, Functional Activity [place], Functional 

Strength, Gait, Safety, Therapeutic Exercise, Transfers


Treatment Duration:  Sep 11, 2019


Frequency:  11 times per week


Estimated Hrs Per Day:  .25 hour per day


Patient and/or Family Agrees t:  Yes





Time/GCodes


Time In:  900


Time Out:  923


Total Billed Treatment Time:  23


Total Billed Treatment


1 visit


GT 9 min


EX 14 min











INGA GRANADO PT               Sep 5, 2019 09:49

## 2019-09-05 NOTE — DISCHARGE SUMMARY
DATE OF SERVICE:  



DIAGNOSES:

1.  Right knee primary osteoarthritis.

2.  Reflux.

3.  Hypertension.



PROCEDURE:

Right total knee arthroplasty.



SUMMARY:

The patient is a 54-year-old female, who was admitted the day of a right total

knee arthroplasty, which she underwent without complications.  Postoperatively,

she did very well.  At the time of discharge, her wound was clean and dry.  She

had no calf tenderness.  Negative Homans sign.  She was tolerating diet well and

tolerating pain with oral pain medication.



CONDITION AT DISCHARGE:

Good.



DISCHARGE DIET:

Regular.



FOLLOWUP:

Followup is in 3 weeks.  Home physical therapy has been arranged.



DISCHARGE MEDICATIONS:

Home medications, Percocet as needed for pain and aspirin.





Job ID: [id number]

DocumentID: [id number]

Dictated Date:  09/05/2019 07:39:34

Transcription Date: 09/05/2019 08:45:14

Dictated By: LEESA BAILON MD

## 2019-09-05 NOTE — OCC THERAPY PROGRESS NOTE
Therapy Progress Note


SCREEN ONLY. pt is ad markus in room MOD I using RW with good safety awareness. pt 

stated she is indep both NSG and PT confirmed. OT services not indicate 

secondary to pt being indep. pt agreed she does not need OT services. d/c OT at 

this time. Thank you for the referral.











CLAUDY ZHU OT                Sep 5, 2019 13:16

## 2019-09-05 NOTE — PHYSICAL THERAPY DAILY NOTE
PT Daily Note-Current


Subjective


Patient agrees to PT.





Pain





   Numeric Pain Scale:  8


   Location:  Right


   Location Body Site:  Knee


   Pain Description:  Acute





Mental Status


Patient Orientation:  Normal For Age


Attachments:  IV





Transfers


Therapy Code Descriptions/Definitions 





Functional [place] Measure:


0=Not Assessed/NA        4=Minimal Assistance


1=Total Assistance        5=Supervision or Setup


2=Maximal Assistance  6=Modified [place]


3=Moderate Assistance 7=Complete [place]








Therapy Quality Codes:


6    Independent with activity with or without an assistive device


5    Patient requires set up or clean up by helper.  Patient completes activity 

by  themselves


4    Supervision or touching assist (CGA). [place] provide cues , steadying 

assist


3    The helper provides less than half the effort to complete the activity


2    The helper provides more than half the effort to complete the activity


1    Dependent.  The helper does all the effort to complete an activity 


7    Patient refused to complete or attempt activity


9    The patient did not perform the activity before the current illness or 

injury


88  Not attempted due to Medical conditions or safety concerns


Transfers (B, C, W/C) (FIM):  6


Scootin


Rollin


Supine to/from Sit:  6


Sit to/from Stand:  6





Weight Bearing


Right Lower Extremity:  Right


Weight Bearing/Tolerated





Gait Training


Gait (FIM):  6


Distance (FIM):  3=150 ft


Distance:  400'


Gait Level of Assist:  6


Gait Assistive Device:  FWW


steady, antalgic gait sequence





Exercises


Supine Ex:  Ankle pumps, Quad Set, Heel Slides, Straight leg raise


Supine Reps:  15


Seated Therapy Exercises:  Long arc quads


Seated Reps:  15





Assessment


Patient tolerated treatment well and is in bed with CPM 0-70 in place.





PT Short Term Goals


Short Term Goals


Time Frame:  Sep 11, 2019


Transfers (B,C,W/C) (FIM):  4


Gait (FIM):  2


Gait Distance Comment:  50'


Gait Level of Assist:  4


Gait Assistive Device:  FWW





PT Plan


Treatment/Plan


Treatment Plan:  Continue Plan of Care


Treatment Plan:  Bed Mobility, Education, Functional Activity Luke, Functional 

Strength, Gait, Safety, Therapeutic Exercise, Transfers


Treatment Duration:  Sep 11, 2019


Frequency:  11 times per week


Estimated Hrs Per Day:  .25 hour per day


Patient and/or Family Agrees t:  Yes





Time/GCodes


Time In:  1300


Time Out:  1323


Total Billed Treatment Time:  23


Total Billed Treatment


1 visit


EX 11 in


GT 12 min











INGA GRANADO PT               Sep 5, 2019 14:04

## 2019-09-06 VITALS — SYSTOLIC BLOOD PRESSURE: 175 MMHG | DIASTOLIC BLOOD PRESSURE: 84 MMHG

## 2019-09-06 VITALS — SYSTOLIC BLOOD PRESSURE: 158 MMHG | DIASTOLIC BLOOD PRESSURE: 78 MMHG

## 2019-09-06 VITALS — DIASTOLIC BLOOD PRESSURE: 84 MMHG | SYSTOLIC BLOOD PRESSURE: 175 MMHG

## 2019-09-06 LAB
HCT VFR BLD CALC: 36 % (ref 35–52)
HGB BLD-MCNC: 11.3 G/DL (ref 11.5–16)

## 2019-09-06 RX ADMIN — HYDROMORPHONE HYDROCHLORIDE PRN MG: 2 TABLET ORAL at 12:01

## 2019-09-06 RX ADMIN — OXYCODONE HYDROCHLORIDE AND ACETAMINOPHEN PRN TAB: 5; 325 TABLET ORAL at 03:50

## 2019-09-06 RX ADMIN — HYDROMORPHONE HYDROCHLORIDE PRN MG: 2 TABLET ORAL at 16:19

## 2019-09-06 RX ADMIN — HYDROMORPHONE HYDROCHLORIDE PRN MG: 2 TABLET ORAL at 10:05

## 2019-09-06 RX ADMIN — CYCLOBENZAPRINE HYDROCHLORIDE PRN MG: 10 TABLET, FILM COATED ORAL at 01:43

## 2019-09-06 RX ADMIN — MORPHINE SULFATE PRN MG: 4 INJECTION, SOLUTION INTRAMUSCULAR; INTRAVENOUS at 08:53

## 2019-09-06 RX ADMIN — MORPHINE SULFATE PRN MG: 4 INJECTION, SOLUTION INTRAMUSCULAR; INTRAVENOUS at 04:52

## 2019-09-06 RX ADMIN — HYDROMORPHONE HYDROCHLORIDE PRN MG: 2 TABLET ORAL at 14:07

## 2019-09-06 RX ADMIN — SODIUM CHLORIDE SCH MLS/HR: 900 INJECTION, SOLUTION INTRAVENOUS at 10:05

## 2019-09-06 RX ADMIN — DOCUSATE SODIUM AND SENNOSIDES SCH EA: 8.6; 5 TABLET, FILM COATED ORAL at 08:51

## 2019-09-06 RX ADMIN — ENOXAPARIN SODIUM SCH MG: 30 INJECTION SUBCUTANEOUS at 08:51

## 2019-09-06 RX ADMIN — DIPHENHYDRAMINE HYDROCHLORIDE PRN MG: 50 INJECTION INTRAMUSCULAR; INTRAVENOUS at 12:05

## 2019-09-06 RX ADMIN — Medication SCH EA: at 05:19

## 2019-09-06 RX ADMIN — ASPIRIN SCH MG: 81 TABLET ORAL at 08:50

## 2019-09-06 RX ADMIN — OXYCODONE HYDROCHLORIDE AND ACETAMINOPHEN PRN TAB: 5; 325 TABLET ORAL at 01:43

## 2019-09-06 NOTE — NUR
CRYING, WANTS DIFFERENT PAIN MEDICATIONS, ASHAW HERE AND ORDERS GIVEN, IV FLUIDS DC, HOME 
MEDICATIONS STARTED, DILAUDID 2MG  STARTED AS ORDERED.

## 2019-09-06 NOTE — NUR
PATIENT REPORTED INCREASED PAIN WHEN THIS NURSE ASSESSED PATIENT AT BEGINNING OF SHIFT. THIS 
NURSE WOKE PATIENT UP EVERY TWO HOURS TO ADMINISTER PAIN MEDICATION TO TRY TO KEEP PAIN AT A 
TOLERABLE LEVEL. AT 0350 THIS NURSE ADMINISTERED PERCOCET AND WHEN THIS NURSE WENT TO 
PATIENT ROOM TO REASSESS PATIENT WAS LAYING IN BED WITH EYES CLOSED, APPEARED TO BE 
SLEEPING. APPROXIMATELY THIRTY MINUTES LATER CNA WENT TO PATIENT ROOM TO CHECK VITAL SIGNS 
AND WHEN CNA WOKE PATIENT UP PATIENT BECAME TEARFUL AND SAID THAT HER PAIN WAS A TEN. THIS 
NURSE TOOK PATIENT A DOSE OF IV MORPHINE AND TOLD PATIENT THAT IF HER PAIN INCREASED FROM A 
5 TO A 10 AN HOUR POST-MED (PERCOCET) THAT THE PERCOCET WAS NOT EFFECTIVE IN CONTROLLING HER 
PAIN AND WE WOULD NEED TO FIGURE OUT SOMETHING ELSE FOR PAIN CONTROL. THIS NURSE SPOKE WITH 
DUDLEY FOX AND UPDATED ON PATIENT CONDITION. SADIE FOX GAVE ORDER TO INCREASE PATIENT 
PERCOCET DOSE TO PERCOCET 5/325MG TWO TABS EVERY FOUR HOURS PRN PAIN. THIS NURSE UPDATED 
PATIENT ON NEW ORDER AND ADMINISTERED ONE PERCOCET 5/325MG (AS PATIENT HAD ONE AT 0350). 
PATIENT HAS HAD CPM ON INTERMITTENTLY THROUGHOUT THE SHIFT, MOHIT HOSE ON, AND POLAR PACK IN 
PLACE. PATIENT IS C/O MUSCLE CRAMPING AT THIS TIME. PRN FLEXERIL ADMINISTERED AT 0145. NO 
SWELLING NOTED TO RLE, PULSES ARE PALPABLE, COLOR IS NORMAL FOR RACE, RLE IS WARM. WILL 
CONTINUE TO MONITOR.

## 2019-09-06 NOTE — PROGRESS NOTE
Standard Progress Note


Progress Notes/Assess & Plan


Date Seen by a Provider:  Sep 6, 2019


Time Seen by a Provider:  09:51


Progress/Assessment & Plan


POD2


Patient struggling with pain control and muscle spasms. 





Knee incison well approx with no warmth erythema or drainage. 


Calf soft nontender negative Evens's.


H&H 36 and 11. Tmax 97.1


BP elevated at 158/83





A: S/P Rt. TKA





Plan: HOme today


        Give Ziac


        Increase Flexeril to Q8


        Change pain med to Fentanyl and oral hydromorphone


        Home aspirin











DUDLEY NOEL               Sep 6, 2019 09:58

## 2019-09-06 NOTE — NUR
DEWEY TROY demonstrates understanding of discharge instructions and accurately returns 
instructions upon questioning.  Copy of Post-Discharge Instructions and Medication Discharge 
Instructions given to PATIENT. DEWEY TROY is able to manage continuing needs after 
discharge.  Patients belongings returned to PATIENT. Skin dry and intact; no breakdown 
noted. Patient discharged from [facility]- on 09/06/19 at 1637

 . DEWEY TROY left floor via W/C, accompanied by STAFF.

## 2019-09-06 NOTE — NUR
DISCHARGE INSTRUCTIONS GIVEN, VERBALIZED UNDERSTANDING, PRESCRIPTIONS GIVEN TO  TO 
FILL, SALINE LOCK DC, SITE WITHOUT REDNESS OR SWELLING, DRESSING DRY AND INTACT TO RIGHT 
KNEE, MOHIT HOSE ON, POLAR ICE PACK ON, PAIN MORE CONTROLLED, RATES PAIN 8 ON PAIN SCALE, 
PATIENT RECEIVED DILAUDID EVERY 2 HOURS, UP TO BATHROOM WITH WALKER, MOVING AROUND ROOM, HAD 
BEEN UP IN CHAIR WITH LEGS ELEVATED. VERBALIZED UNDERSTANDING OF FOLLOW UP APPOINTMENT

## 2019-09-06 NOTE — PHYSICAL THERAPY DAILY NOTE
PT Daily Note-Current


Subjective


Patient continues to cry and c/o cramping right LE.  RN is aware.





Pain





   Numeric Pain Scale:  10-Worst Possible Pain


   Location:  Right


   Location Body Site:  Thigh


   Pain Description:  Cramping





Mental Status


Patient Orientation:  Normal For Age





Transfers


Therapy Code Descriptions/Definitions 





Functional [place] Measure:


0=Not Assessed/NA        4=Minimal Assistance


1=Total Assistance        5=Supervision or Setup


2=Maximal Assistance  6=Modified [place]


3=Moderate Assistance 7=Complete [place]








Therapy Quality Codes:


6    Independent with activity with or without an assistive device


5    Patient requires set up or clean up by helper.  Patient completes activity 

by  themselves


4    Supervision or touching assist (CGA). [place] provide cues , steadying 

assist


3    The helper provides less than half the effort to complete the activity


2    The helper provides more than half the effort to complete the activity


1    Dependent.  The helper does all the effort to complete an activity 


7    Patient refused to complete or attempt activity


9    The patient did not perform the activity before the current illness or 

injury


88  Not attempted due to Medical conditions or safety concerns


Transfers (B, C, W/C) (FIM):  6


Scootin


Rollin


Supine to/from Sit:  6


Sit to/from Stand:  6


Bed to/from Chair:  6


patient toileted independently





Weight Bearing


Right Lower Extremity:  Right


Weight Bearing/Tolerated





Gait Training


Gait (FIM):  6


Distance (FIM):  3=150 ft


Distance:  300'


Gait Level of Assist:  6


Gait Assistive Device:  FWW


slow, antalgic





Exercises


Supine Ex:  Ankle pumps, Quad Set, Heel Slides, Straight leg raise


Supine Reps:  12 (AAROM right LE)


Seated Therapy Exercises:  Ankle pumps, Long arc quads


Seated Reps:  12 (AAROM right LE)





PT Short Term Goals


Short Term Goals


Time Frame:  Sep 11, 2019


Transfers (B,C,W/C) (FIM):  4


Gait (FIM):  2


Gait Distance Comment:  50'


Gait Level of Assist:  4


Gait Assistive Device:  FWW





PT Plan


Treatment/Plan


Treatment Plan:  Continue Plan of Care


Treatment Plan:  Bed Mobility, Education, Functional Activity Luke, Functional 

Strength, Gait, Safety, Therapeutic Exercise, Transfers


Treatment Duration:  Sep 11, 2019


Frequency:  11 times per week


Estimated Hrs Per Day:  .25 hour per day


Patient and/or Family Agrees t:  Yes





Time/GCodes


Time In:  1300


Time Out:  1330


Total Billed Treatment Time:  30


Total Billed Treatment


1 visit


EX 16 min


FA 14 min











INGA GRANADO PT               Sep 6, 2019 13:57

## 2019-09-06 NOTE — PHYSICAL THERAPY DAILY NOTE
PT Daily Note-Current


Subjective


Patient in 10/10 right knee pain with meds issued.





Pain





   Numeric Pain Scale:  10-Worst Possible Pain


   Location:  Right


   Location Body Site:  Knee


   Pain Description:  Acute





Mental Status


Patient Orientation:  Normal For Age


Attachments:  Polar Pack





Transfers


Therapy Code Descriptions/Definitions 





Functional [place] Measure:


0=Not Assessed/NA        4=Minimal Assistance


1=Total Assistance        5=Supervision or Setup


2=Maximal Assistance  6=Modified [place]


3=Moderate Assistance 7=Complete [place]








Therapy Quality Codes:


6    Independent with activity with or without an assistive device


5    Patient requires set up or clean up by helper.  Patient completes activity 

by  themselves


4    Supervision or touching assist (CGA). [place] provide cues , steadying 

assist


3    The helper provides less than half the effort to complete the activity


2    The helper provides more than half the effort to complete the activity


1    Dependent.  The helper does all the effort to complete an activity 


7    Patient refused to complete or attempt activity


9    The patient did not perform the activity before the current illness or 

injury


88  Not attempted due to Medical conditions or safety concerns


Transfers (B, C, W/C) (FIM):  6


Scootin


Rollin


Supine to/from Sit:  6


Sit to/from Stand:  6





Weight Bearing


Right Lower Extremity:  Right


Weight Bearing/Tolerated





Gait Training


Gait (FIM):  6


Distance (FIM):  3=150 ft


Distance:  300'


Gait Level of Assist:  6


Gait Assistive Device:  FWW





Stair Training


 Stair Training: Handrails/:  1 handrail, uses walker


Stairs (FIM):  2


#of Steps:  4


Stairs:  Pattern:  Step to


Level of Assist:  5





Exercises


Supine Ex:  Ankle pumps, Quad Set, Heel Slides, Straight leg raise


Supine Reps:  12


Seated Therapy Exercises:  Ankle pumps, Long arc quads


Seated Reps:  12





Assessment


CPM 0-70 degrees with polar pack in place.  Plan to dismiss on this date to 

home.  Physician changing pain medication due to uncontrolled right knee pain.





PT Short Term Goals


Short Term Goals


Time Frame:  Sep 11, 2019


Transfers (B,C,W/C) (FIM):  4


Gait (FIM):  2


Gait Distance Comment:  50'


Gait Level of Assist:  4


Gait Assistive Device:  FWW





PT Plan


Treatment/Plan


Treatment Plan:  Continue Plan of Care


Treatment Plan:  Bed Mobility, Education, Functional Activity Luke, Functional 

Strength, Gait, Safety, Therapeutic Exercise, Transfers


Treatment Duration:  Sep 11, 2019


Frequency:  11 times per week


Estimated Hrs Per Day:  .25 hour per day


Patient and/or Family Agrees t:  Yes





Time/GCodes


Time In:  920


Time Out:  950


Total Billed Treatment Time:  30


Total Billed Treatment


1 visit


FA 16 min


EX 14 min











INGA GRANADO PT               Sep 6, 2019 11:03

## 2019-12-20 ENCOUNTER — HOSPITAL ENCOUNTER (OUTPATIENT)
Dept: HOSPITAL 75 - REHAB | Age: 54
Discharge: HOME | End: 2019-12-20
Attending: ORTHOPAEDIC SURGERY
Payer: COMMERCIAL

## 2019-12-20 DIAGNOSIS — Z90.710: ICD-10-CM

## 2019-12-20 DIAGNOSIS — Z96.651: ICD-10-CM

## 2019-12-20 DIAGNOSIS — Z47.1: Primary | ICD-10-CM

## 2019-12-20 DIAGNOSIS — Z72.0: ICD-10-CM

## 2020-08-27 NOTE — DIAGNOSTIC IMAGING REPORT
INDICATION: Preop for knee replacement surgery.



TIME OF EXAM: 02:10 p.m.



COMPARISON: Comparison is made with prior chest from 08/29/2019.



FINDINGS: The heart size is normal. The pulmonary vascularity is

unremarkable. The lungs are clear. No infiltrate, effusion or

pneumothorax is detected.



IMPRESSION: No acute cardiopulmonary process is detected.



Dictated by: 



  Dictated on workstation # [id number]

## 2022-09-12 NOTE — ED GU-FEMALE
General


Chief Complaint:   - Reproductive


Stated Complaint:  FREQUENT URINATION ,BACK PAIN


Source:  patient





History of Present Illness


Date Seen by Provider:  Sep 12, 2022


Time Seen by Provider:  22:00


Initial Comments


PT ARRIVES VIA POV FROM HOME


C/O RIGHT FLANK/LOW BACK PAIN FOR A COUPLE OF WEEKS


TODAY, SHE BEGAN TO HAVE PAIN/BURNING ON URINATION, HEMATURIA WITH CLOTS, 

BLADDER SPASMS





NO FEVER


NO NAUSEA/VOMITING





HAS NOT HAD UTI BEFORE, BUT HAS HAD A KIDNEY STONE IN THE PAST





HAS NOT TAKEN ANYTHING FOR SYMPTOMS





PT HAS HAD PRIOR HYSTERECTOMY





PCP: DR. PACKER





Allergies and Home Medications


Allergies


Coded Allergies:  


     No Known Drug Allergies (Unverified , 9/2/20)





Patient Home Medication List


Home Medication List Reviewed:  Yes


Bisoprolol Fumarate/Hctz (Ziac 2.5-6.25 mg Tablet) 1 Each Tablet, 1 TAB PO 

DAILY, (Reported)


   Entered as Reported by: ELIO MARTINEZ on 8/29/19 1105


Citalopram Hydrobromide (Celexa) 20 Mg Tablet, 20 MG PO DAILY, (Reported)


   Entered as Reported by: ELIO MARTINEZ on 8/27/20 1339


Esomeprazole Magnesium (Nexium 24Hr) 20 Mg Tablet.dr, 20 MG PO Q48H, (Reported)


   Entered as Reported by: ELIO MARTINEZ on 8/29/19 1105


Hydrocodone/Acetaminophen (Hydrocodone-Acetamin 5-325 mg) 5 Mg-325 Mg Tablet, 1 

EACH PO Q4-6 HOURS PRN for PAIN


   Prescribed by: DARCI DAVIS on 9/12/22 2255


Ketorolac Tromethamine (Ketorolac Tromethamine) 10 Mg Tablet, 10 MG PO Q6H


   Prescribed by: DARCI DAVIS on 9/12/22 2257


Levofloxacin (Levofloxacin) 500 Mg Tablet, 500 MG PO DAILY


   Prescribed by: DARCI DAVIS on 9/12/22 2255


Meloxicam (Mobic) 15 Mg Tablet, 15 MG PO DAILY, (Reported)


   Entered as Reported by: ELIO MARTINEZ on 8/29/19 1105


Ondansetron (Ondansetron Odt) 8 Mg Tab.rapdis, 8 MG PO Q6H


   Prescribed by: DARCI DAVIS on 9/12/22 2255


Phenazopyridine HCl (Pyridium) 200 Mg Tablet, 1 TAB PO TID


   Prescribed by: DARCI DAVIS on 9/12/22 8282





Review of Systems


Review of Systems


Constitutional:  no symptoms reported


Respiratory:  no symptoms reported


Cardiovascular:  no symptoms reported


Gastrointestinal:  No abdominal pain, No diarrhea, No nausea, No vomiting


Genitourinary:  see HPI, dysuria, frequency, flank pain, hematuria, pain, 

urgency


Musculoskeletal:  see HPI, back pain


Skin:  no symptoms reported


Psychiatric/Neurological:  No Symptoms Reported





Past Medical-Social-Family Hx


Patient Social History


Tobacco Use?:  Yes


Tobacco type used:  Cigarettes


Smoking Status:  Current Everyday Smoker


Use of E-Cig and/or Vaping dev:  No


Substance use?:  No


Alcohol Use?:  No


Pt feels they are or have been:  No





Immunizations Up To Date


Influenza Vaccine Up-to-Date:  Yes; Up-to-Date


COVID19 Vaccine :  moderna





Seasonal Allergies


Seasonal Allergies:  No





Past Medical History


Surgeries:  Yes (D&C, DXLS, R TKR)


Respiratory:  No


Cardiac:  Yes


Neurological:  No


Genitourinary:  No


Gastrointestinal:  Yes


Gastroesophageal Reflux


Musculoskeletal:  Yes (DJD)


Endocrine:  No


HEENT:  Yes (glasses, dentures)


Cancer:  No


Psychosocial:  No


Integumentary:  No


Blood Disorders:  No





Family Medical History





Alcoholism


  G8 BROTHER


Cardiovascular disease


  19 MOTHER


Colon cancer


  19 FATHER


Completed stroke


  19 MOTHER


  G8 BROTHER


Diabetes mellitus


  19 MOTHER


Drug abuse


  G8 BROTHER


Hypertension


  19 MOTHER





Physical Exam


Vital Signs





Vital Signs - First Documented








 9/12/22





 22:01


 


Temp 37.0


 


Pulse 78


 


Resp 18


 


B/P (MAP) 167/84 (111)


 


Pulse Ox 98


 


O2 Delivery Room Air





Capillary Refill :


Height, Weight, BMI


Height: 5'4.00"


Weight: 208lbs. 4.0oz. [id number]; 35.97 BMI


Method:


General Appearance:  WD/WN, obese, other (UNABLE TO SIT STILL, ROCKING BACK AND 

FORTH; ODOR OF CIGARETTES)


Cardiovascular:  regular rate, rhythm, no edema, no murmur


Respiratory:  normal breath sounds, no respiratory distress, no accessory muscle

use


Gastrointestinal:  soft, tenderness (SUPRAPUBIC AND RIGHT FLANK TENDERNESS)


Back:  no vertebral tenderness, CVA tenderness (R)


Extremities:  normal inspection, normal capillary refill


Neurologic/Psychiatric:  CNs II-XII nml as tested, no motor/sensory deficits, 

alert, oriented x 3


Skin:  normal color, warm/dry; No rash; tattoos/piercings (TATTOOS)





Progress/Results/Core Measures


Suspected Sepsis


SIRS


Temperature: 


Pulse:  


Respiratory Rate: 


 


Laboratory Tests


9/12/22 22:17: White Blood Count 14.2H


Blood Pressure  / 


Mean: 


 





Laboratory Tests


9/12/22 22:17: 


Creatinine 0.86, Platelet Count 265, Total Bilirubin 0.3








Results/Orders


Lab Results





Laboratory Tests








Test


 9/12/22


22:01 9/12/22


22:17 Range/Units


 


 


Urine Color ORANGE    


 


Urine Clarity CLOUDY    


 


Urine pH 6.0   5-9  


 


Urine Specific Gravity 1.015 L  1.016-1.022  


 


Urine Protein 2+ H  NEGATIVE  


 


Urine Glucose (UA) NEGATIVE   NEGATIVE  


 


Urine Ketones NEGATIVE   NEGATIVE  


 


Urine Nitrite NEGATIVE   NEGATIVE  


 


Urine Bilirubin NEGATIVE   NEGATIVE  


 


Urine Urobilinogen 0.2   < = 1.0  MG/DL


 


Urine Leukocyte Esterase 2+ H  NEGATIVE  


 


Urine RBC (Auto) 3+ H  NEGATIVE  


 


Urine RBC  H   /HPF


 


Urine WBC 25-50 H   /HPF


 


Urine Squamous Epithelial


Cells 0-2 


 


  /HPF





 


Urine Renal Epithelial Cells NONE    /HPF


 


Urine Crystals NONE    /LPF


 


Urine Bacteria FEW H   /HPF


 


Urine Casts NONE    /LPF


 


Urine Mucus NEGATIVE    /LPF


 


Urine Culture Indicated YES    


 


White Blood Count


 


 14.2 H


 4.3-11.0


10^3/uL


 


Red Blood Count


 


 4.41 


 3.80-5.11


10^6/uL


 


Hemoglobin  13.8  11.5-16.0  g/dL


 


Hematocrit  41  35-52  %


 


Mean Corpuscular Volume  92  80-99  fL


 


Mean Corpuscular Hemoglobin  31  25-34  pg


 


Mean Corpuscular Hemoglobin


Concent 


 34 


 32-36  g/dL





 


Red Cell Distribution Width  12.6  10.0-14.5  %


 


Platelet Count


 


 265 


 130-400


10^3/uL


 


Mean Platelet Volume  9.4  9.0-12.2  fL


 


Immature Granulocyte % (Auto)  1   %


 


Neutrophils (%) (Auto)  73  42-75  %


 


Lymphocytes (%) (Auto)  19  12-44  %


 


Monocytes (%) (Auto)  6  0-12  %


 


Eosinophils (%) (Auto)  1  0-10  %


 


Basophils (%) (Auto)  0  0-10  %


 


Neutrophils # (Auto)


 


 10.4 H


 1.8-7.8


10^3/uL


 


Lymphocytes # (Auto)


 


 2.6 


 1.0-4.0


10^3/uL


 


Monocytes # (Auto)


 


 0.8 


 0.0-1.0


10^3/uL


 


Eosinophils # (Auto)


 


 0.2 


 0.0-0.3


10^3/uL


 


Basophils # (Auto)


 


 0.1 


 0.0-0.1


10^3/uL


 


Immature Granulocyte # (Auto)


 


 0.1 


 0.0-0.1


10^3/uL


 


Neutrophils % (Manual)  80   %


 


Lymphocytes % (Manual)  9   %


 


Monocytes % (Manual)  9   %


 


Eosinophils % (Manual)  2   %


 


Blood Morphology Comment  NORMAL   


 


Sodium Level  141  135-145  MMOL/L


 


Potassium Level  3.7  3.6-5.0  MMOL/L


 


Chloride Level  105    MMOL/L


 


Carbon Dioxide Level  23  21-32  MMOL/L


 


Anion Gap  13  5-14  MMOL/L


 


Blood Urea Nitrogen  8  7-18  MG/DL


 


Creatinine


 


 0.86 


 0.60-1.30


MG/DL


 


Estimat Glomerular Filtration


Rate 


 78 


  





 


BUN/Creatinine Ratio  9   


 


Glucose Level  138 H   MG/DL


 


Calcium Level  9.2  8.5-10.1  MG/DL


 


Corrected Calcium  9.4  8.5-10.1  MG/DL


 


Total Bilirubin  0.3  0.1-1.0  MG/DL


 


Aspartate Amino Transf


(AST/SGOT) 


 26 


 5-34  U/L





 


Alanine Aminotransferase


(ALT/SGPT) 


 39 


 0-55  U/L





 


Alkaline Phosphatase  105    U/L


 


Total Protein  6.9  6.4-8.2  GM/DL


 


Albumin  3.8  3.2-4.5  GM/DL








My Orders





Orders - DARCI DAVIS K DO


Ua Culture If Indicated (9/12/22 22:00)


Ed Iv/Invasive Line Start (9/12/22 22:07)


Ct Abd/Pelvis Wo(Kidney Stone) (9/12/22 22:07)


Abdomen/Kub 1view (9/12/22 22:07)


Cbc With Automated Diff (9/12/22 22:07)


Comprehensive Metabolic Panel (9/12/22 22:07)


Ed Iv/Invasive Line Start (9/12/22 22:07)


Lactated Ringers (Lr 1000 Ml Iv Solution (9/12/22 22:15)


Ketorolac Injection (Toradol Injection) (9/12/22 22:07)


Urine Culture (9/12/22 22:01)


Ceftriaxone 1 Gm Pre-Mix (Rocephin 1 Gm (9/12/22 22:21)


Manual Differential (9/12/22 22:17)


Rx-Hydrocodone/Apap 5-325 Mg (Rx-Vicodin (9/12/22 23:00)


Rx-Ondansetron Po (Rx-Zofran Po) (9/12/22 22:56)


Phenazopyridine Tablet (Pyridium Tablet) (9/12/22 23:00)





Medications Given in ED





Current Medications








 Medications  Dose


 Ordered  Sig/Tiffanie


 Route  Start Time


 Stop Time Status Last Admin


Dose Admin


 


 Lactated Ringer's  1,000 ml @ 


 0 mls/hr  Q0M ONCE


 IV  9/12/22 22:15


 9/12/22 22:16 DC 9/12/22 22:18


999 MLS/HR








Vital Signs/I&O











 9/12/22





 22:01


 


Temp 37.0


 


Pulse 78


 


Resp 18


 


B/P (MAP) 167/84 (111)


 


Pulse Ox 98


 


O2 Delivery Room Air





Capillary Refill :


Progress Note :  


Progress Note


GIVEN IV FLUIDS, TORADOL AND ZOFRAN


GIVEN ROCEPHIN FOR UTI





SYMPTOMS IMPROVED AT DISMISSAL





Diagnostic Imaging





Comments


CT ABDOMEN/PELVIS--PER RADIOLOGIST REPORT AT 2251


The visualized portions of the lung bases are clear. There were


no pleural fluid collections. There is no free intraperitoneal


air.





The liver shows diffuse fatty infiltration without focal lesion.


Gallbladder appeared normal. The spleen, adrenals, and pancreas


appear normal. Kidneys bilaterally show no radiopaque stones or


hydronephrosis. There are no ureteral stones identified. There is


no retroperitoneal mass or adenopathy. There is no ascites or


abnormal fluid collection. Visualized bowel loops appear


unremarkable. The appendix appears normal. There is no pelvic


mass or free fluid. Patient appears to have had previous


hysterectomy.





IMPRESSION:





Diffuse fatty infiltration of the liver without focal lesion. No


urinary tract stones or hydronephrosis. Appendix appears normal.


No other significant finding.


   Reviewed:  Reviewed by Me





Departure


Impression





   Primary Impression:  


   Urinary tract infection with hematuria


Disposition:  01 HOME, SELF-CARE


Condition:  Stable





Departure-Patient Inst.


Decision time for Depature:  22:52


Referrals:  


DARCI PACKER MD (PCP/Family)


Primary Care Physician


Patient Instructions:  Urinary Tract Infection, Adult (DC), Blood in Urine 

(Hematuria), Adult ED





Add. Discharge Instructions:  


LOTS OF CLEAR LIQUIDS--WATER, BROTH, JELLO, GATORADE


NO COFFEE, POP OR TEA





FOLLOW UP WITH DR. PACKER IN 2-3 DAYS FOR FURTHER CARE--CALL IN THE MORNING TO

SCHEDULE APPOINTMENT


RETURN TO ER IF SYMPTOMS WORSEN





All discharge instructions reviewed with patient and/or family. Voiced 

understanding.


Scripts


Ketorolac Tromethamine (Ketorolac Tromethamine) 10 Mg Tablet


10 MG PO Q6H for Pain, #15 TAB


   Prov: DARCI DAVIS DO         9/12/22 


Hydrocodone/Acetaminophen (Hydrocodone-Acetamin 5-325 mg) 5 Mg-325 Mg Tablet


1 EACH PO Q4-6 HOURS PRN for PAIN, #10 TAB


   Prov: DARCI DAVIS DO         9/12/22 


Phenazopyridine HCl (Pyridium) 200 Mg Tablet


1 TAB PO TID, #15 TAB


   Prov: DARCI DAVIS DO         9/12/22 


Ondansetron (Ondansetron Odt) 8 Mg Tab.rapdis


8 MG PO Q6H, #10 TAB


   Prov: DARCI DAVIS DO         9/12/22 


Levofloxacin (Levofloxacin) 500 Mg Tablet


500 MG PO DAILY, #10 TAB 0 Refills


   Prov: DARCI DAVIS DO         9/12/22











DARCI DAVIS DO                 Sep 12, 2022 22:12

## 2022-09-12 NOTE — DIAGNOSTIC IMAGING REPORT
INDICATION: Abdominal pain



Abdominal film obtained at 10:46 p.m.



The abdominal bowel gas pattern is unremarkable. There is no

overt obstruction or ileus. There are no suspicious

calcifications.



IMPRESSION:



Unremarkable abdominal radiograph.



Dictated by: 



  Dictated on workstation # [id number]

## 2022-09-12 NOTE — DIAGNOSTIC IMAGING REPORT
INDICATION: Right flank pain, history of renal stones.



TECHNIQUE: Multiple contiguous axial images were obtained through

the abdomen and pelvis without the use of intravenous contrast.

Auto Exposure Controls were utilized during the CT exam to meet

ALARA standards for radiation dose reduction. 



There is no previous study for comparison.



The visualized portions of the lung bases are clear. There were

no pleural fluid collections. There is no free intraperitoneal

air.



The liver shows diffuse fatty infiltration without focal lesion.

Gallbladder appeared normal. The spleen, adrenals, and pancreas

appear normal. Kidneys bilaterally show no radiopaque stones or

hydronephrosis. There are no ureteral stones identified. There is

no retroperitoneal mass or adenopathy. There is no ascites or

abnormal fluid collection. Visualized bowel loops appear

unremarkable. The appendix appears normal. There is no pelvic

mass or free fluid. Patient appears to have had previous

hysterectomy.



IMPRESSION:



Diffuse fatty infiltration of the liver without focal lesion. No

urinary tract stones or hydronephrosis. Appendix appears normal.

No other significant finding.





Dictated by: 



  Dictated on workstation # [id number]